# Patient Record
Sex: FEMALE | Race: WHITE | Employment: OTHER | URBAN - METROPOLITAN AREA
[De-identification: names, ages, dates, MRNs, and addresses within clinical notes are randomized per-mention and may not be internally consistent; named-entity substitution may affect disease eponyms.]

---

## 2021-11-12 ENCOUNTER — TRANSCRIBE ORDER (OUTPATIENT)
Dept: SCHEDULING | Age: 66
End: 2021-11-12

## 2021-11-12 DIAGNOSIS — D35.1 BENIGN NEOPLASM OF PARATHYROID GLAND: Primary | ICD-10-CM

## 2021-11-22 ENCOUNTER — HOSPITAL ENCOUNTER (OUTPATIENT)
Dept: ULTRASOUND IMAGING | Age: 66
Discharge: HOME | End: 2021-11-22
Attending: OTOLARYNGOLOGY
Payer: MEDICARE

## 2021-11-22 ENCOUNTER — HOSPITAL ENCOUNTER (OUTPATIENT)
Dept: CT IMAGING | Age: 66
Discharge: HOME | End: 2021-11-22
Attending: OTOLARYNGOLOGY
Payer: MEDICARE

## 2021-11-22 DIAGNOSIS — D35.1 BENIGN NEOPLASM OF PARATHYROID GLAND: ICD-10-CM

## 2021-11-22 PROCEDURE — 74011000636 HC RX REV CODE- 636: Performed by: OTOLARYNGOLOGY

## 2021-11-22 PROCEDURE — 70491 CT SOFT TISSUE NECK W/DYE: CPT

## 2021-11-22 PROCEDURE — 76536 US EXAM OF HEAD AND NECK: CPT

## 2021-11-22 RX ADMIN — IOPAMIDOL 100 ML: 612 INJECTION, SOLUTION INTRAVENOUS at 11:38

## 2021-12-08 ENCOUNTER — TRANSCRIBE ORDER (OUTPATIENT)
Dept: SCHEDULING | Age: 66
End: 2021-12-08

## 2021-12-08 DIAGNOSIS — D34 THYROID ADENOMA: Primary | ICD-10-CM

## 2021-12-08 DIAGNOSIS — E21.0 PRIMARY HYPERPARATHYROIDISM (HCC): ICD-10-CM

## 2021-12-09 ENCOUNTER — ANESTHESIA EVENT (OUTPATIENT)
Dept: MEDSURG UNIT | Age: 66
End: 2021-12-09
Payer: MEDICARE

## 2021-12-10 ENCOUNTER — HOSPITAL ENCOUNTER (OUTPATIENT)
Age: 66
Setting detail: OBSERVATION
Discharge: HOME | End: 2021-12-11
Attending: OTOLARYNGOLOGY | Admitting: OTOLARYNGOLOGY
Payer: MEDICARE

## 2021-12-10 ENCOUNTER — ANESTHESIA (OUTPATIENT)
Dept: MEDSURG UNIT | Age: 66
End: 2021-12-10
Payer: MEDICARE

## 2021-12-10 ENCOUNTER — APPOINTMENT (OUTPATIENT)
Dept: NUCLEAR MEDICINE | Age: 66
End: 2021-12-10
Attending: OTOLARYNGOLOGY
Payer: MEDICARE

## 2021-12-10 DIAGNOSIS — E21.0 PRIMARY HYPERPARATHYROIDISM (HCC): ICD-10-CM

## 2021-12-10 DIAGNOSIS — D34 THYROID ADENOMA: ICD-10-CM

## 2021-12-10 DIAGNOSIS — E21.3 HYPERPARATHYROIDISM (HCC): Primary | ICD-10-CM

## 2021-12-10 LAB
HGB BLD-MCNC: 14.3 G/DL (ref 11.5–16)
INTRA-OP PTH, IPTHRT: 144.3 PG/ML (ref 18.4–88)
INTRA-OP PTH, IPTHRT: 39.4 PG/ML (ref 18.4–88)
PTH-INTACT IO % DIF SERPL: NORMAL %
PTH-INTACT P EXCISION SERPL-MCNC: 28.1 PG/ML (ref 18.4–88)
SPECIMEN DRAWN SERPL: 1015
SPECIMEN DRAWN SERPL: 1120
SPECIMEN DRAWN SERPL: 1140

## 2021-12-10 PROCEDURE — 85018 HEMOGLOBIN: CPT

## 2021-12-10 PROCEDURE — 74011250637 HC RX REV CODE- 250/637: Performed by: ANESTHESIOLOGY

## 2021-12-10 PROCEDURE — 77030002933 HC SUT MCRYL J&J -A: Performed by: OTOLARYNGOLOGY

## 2021-12-10 PROCEDURE — 77030040922 HC BLNKT HYPOTHRM STRY -A

## 2021-12-10 PROCEDURE — 76060000062 HC AMB SURG ANES 1 TO 1.5 HR: Performed by: OTOLARYNGOLOGY

## 2021-12-10 PROCEDURE — 74011250636 HC RX REV CODE- 250/636: Performed by: ANESTHESIOLOGY

## 2021-12-10 PROCEDURE — 88331 PATH CONSLTJ SURG 1 BLK 1SPC: CPT

## 2021-12-10 PROCEDURE — 77030031139 HC SUT VCRL2 J&J -A: Performed by: OTOLARYNGOLOGY

## 2021-12-10 PROCEDURE — 83970 ASSAY OF PARATHORMONE: CPT

## 2021-12-10 PROCEDURE — 77030002916 HC SUT ETHLN J&J -A: Performed by: OTOLARYNGOLOGY

## 2021-12-10 PROCEDURE — 76030000019 HC AMB SURG 1 TO 1.5 HR INTENSV-TIER 1: Performed by: OTOLARYNGOLOGY

## 2021-12-10 PROCEDURE — 74011250636 HC RX REV CODE- 250/636: Performed by: OTOLARYNGOLOGY

## 2021-12-10 PROCEDURE — G0378 HOSPITAL OBSERVATION PER HR: HCPCS

## 2021-12-10 PROCEDURE — 78070 PARATHYROID PLANAR IMAGING: CPT

## 2021-12-10 PROCEDURE — 74011000250 HC RX REV CODE- 250: Performed by: SURGERY

## 2021-12-10 PROCEDURE — 93005 ELECTROCARDIOGRAM TRACING: CPT

## 2021-12-10 PROCEDURE — 2709999900 HC NON-CHARGEABLE SUPPLY: Performed by: OTOLARYNGOLOGY

## 2021-12-10 PROCEDURE — 74011000250 HC RX REV CODE- 250: Performed by: OTOLARYNGOLOGY

## 2021-12-10 PROCEDURE — 77030008698 HC TU ET REINF MEDT -D: Performed by: NURSE ANESTHETIST, CERTIFIED REGISTERED

## 2021-12-10 PROCEDURE — 74011250636 HC RX REV CODE- 250/636: Performed by: NURSE ANESTHETIST, CERTIFIED REGISTERED

## 2021-12-10 PROCEDURE — 77030014008 HC SPNG HEMSTAT J&J -C: Performed by: OTOLARYNGOLOGY

## 2021-12-10 PROCEDURE — 77030032988 HC TU ET NIM TRIVNTG EMG MEDT -D: Performed by: OTOLARYNGOLOGY

## 2021-12-10 PROCEDURE — 77030026438 HC STYL ET INTUB CARD -A: Performed by: NURSE ANESTHETIST, CERTIFIED REGISTERED

## 2021-12-10 PROCEDURE — 88305 TISSUE EXAM BY PATHOLOGIST: CPT

## 2021-12-10 PROCEDURE — 74011250637 HC RX REV CODE- 250/637: Performed by: OTOLARYNGOLOGY

## 2021-12-10 PROCEDURE — 36415 COLL VENOUS BLD VENIPUNCTURE: CPT

## 2021-12-10 PROCEDURE — 76210000038 HC AMBSU PH I REC 2.5 TO 3 HR: Performed by: OTOLARYNGOLOGY

## 2021-12-10 PROCEDURE — 74011000250 HC RX REV CODE- 250: Performed by: NURSE ANESTHETIST, CERTIFIED REGISTERED

## 2021-12-10 PROCEDURE — 77030031753 HC SHR ENDO COAG HARM J&J -E: Performed by: OTOLARYNGOLOGY

## 2021-12-10 PROCEDURE — 77030021052 HC RNG RETRCTR STAY COOP -A: Performed by: OTOLARYNGOLOGY

## 2021-12-10 RX ORDER — SODIUM CHLORIDE 0.9 % (FLUSH) 0.9 %
5-40 SYRINGE (ML) INJECTION AS NEEDED
Status: DISCONTINUED | OUTPATIENT
Start: 2021-12-10 | End: 2021-12-10 | Stop reason: HOSPADM

## 2021-12-10 RX ORDER — HYDROMORPHONE HYDROCHLORIDE 1 MG/ML
0.2 INJECTION, SOLUTION INTRAMUSCULAR; INTRAVENOUS; SUBCUTANEOUS
Status: DISCONTINUED | OUTPATIENT
Start: 2021-12-10 | End: 2021-12-10 | Stop reason: HOSPADM

## 2021-12-10 RX ORDER — DEXTROSE, SODIUM CHLORIDE, AND POTASSIUM CHLORIDE 5; .45; .15 G/100ML; G/100ML; G/100ML
25 INJECTION INTRAVENOUS CONTINUOUS
Status: DISCONTINUED | OUTPATIENT
Start: 2021-12-10 | End: 2021-12-11 | Stop reason: HOSPADM

## 2021-12-10 RX ORDER — EPHEDRINE SULFATE/0.9% NACL/PF 50 MG/5 ML
5 SYRINGE (ML) INTRAVENOUS AS NEEDED
Status: DISCONTINUED | OUTPATIENT
Start: 2021-12-10 | End: 2021-12-10 | Stop reason: HOSPADM

## 2021-12-10 RX ORDER — LIDOCAINE HYDROCHLORIDE 10 MG/ML
0.1 INJECTION, SOLUTION EPIDURAL; INFILTRATION; INTRACAUDAL; PERINEURAL AS NEEDED
Status: DISCONTINUED | OUTPATIENT
Start: 2021-12-10 | End: 2021-12-10 | Stop reason: HOSPADM

## 2021-12-10 RX ORDER — DIPHENHYDRAMINE HYDROCHLORIDE 50 MG/ML
12.5 INJECTION, SOLUTION INTRAMUSCULAR; INTRAVENOUS AS NEEDED
Status: DISCONTINUED | OUTPATIENT
Start: 2021-12-10 | End: 2021-12-10 | Stop reason: HOSPADM

## 2021-12-10 RX ORDER — MIDAZOLAM HYDROCHLORIDE 1 MG/ML
1 INJECTION, SOLUTION INTRAMUSCULAR; INTRAVENOUS AS NEEDED
Status: DISCONTINUED | OUTPATIENT
Start: 2021-12-10 | End: 2021-12-10 | Stop reason: HOSPADM

## 2021-12-10 RX ORDER — MIDAZOLAM HYDROCHLORIDE 1 MG/ML
0.5 INJECTION, SOLUTION INTRAMUSCULAR; INTRAVENOUS
Status: DISCONTINUED | OUTPATIENT
Start: 2021-12-10 | End: 2021-12-10 | Stop reason: HOSPADM

## 2021-12-10 RX ORDER — SODIUM CHLORIDE 0.9 % (FLUSH) 0.9 %
5-40 SYRINGE (ML) INJECTION AS NEEDED
Status: DISCONTINUED | OUTPATIENT
Start: 2021-12-10 | End: 2021-12-11 | Stop reason: HOSPADM

## 2021-12-10 RX ORDER — FENTANYL CITRATE 50 UG/ML
25 INJECTION, SOLUTION INTRAMUSCULAR; INTRAVENOUS
Status: DISCONTINUED | OUTPATIENT
Start: 2021-12-10 | End: 2021-12-10 | Stop reason: HOSPADM

## 2021-12-10 RX ORDER — HYDRALAZINE HYDROCHLORIDE 20 MG/ML
10 INJECTION INTRAMUSCULAR; INTRAVENOUS
Status: DISCONTINUED | OUTPATIENT
Start: 2021-12-10 | End: 2021-12-11 | Stop reason: HOSPADM

## 2021-12-10 RX ORDER — SUCCINYLCHOLINE CHLORIDE 20 MG/ML
INJECTION INTRAMUSCULAR; INTRAVENOUS AS NEEDED
Status: DISCONTINUED | OUTPATIENT
Start: 2021-12-10 | End: 2021-12-10 | Stop reason: HOSPADM

## 2021-12-10 RX ORDER — MORPHINE SULFATE 2 MG/ML
2 INJECTION, SOLUTION INTRAMUSCULAR; INTRAVENOUS
Status: DISCONTINUED | OUTPATIENT
Start: 2021-12-10 | End: 2021-12-10 | Stop reason: HOSPADM

## 2021-12-10 RX ORDER — ACETAMINOPHEN 325 MG/1
650 TABLET ORAL ONCE
Status: COMPLETED | OUTPATIENT
Start: 2021-12-10 | End: 2021-12-10

## 2021-12-10 RX ORDER — SODIUM CHLORIDE 0.9 % (FLUSH) 0.9 %
5-40 SYRINGE (ML) INJECTION EVERY 8 HOURS
Status: DISCONTINUED | OUTPATIENT
Start: 2021-12-10 | End: 2021-12-10 | Stop reason: HOSPADM

## 2021-12-10 RX ORDER — FENTANYL CITRATE 50 UG/ML
50 INJECTION, SOLUTION INTRAMUSCULAR; INTRAVENOUS AS NEEDED
Status: DISCONTINUED | OUTPATIENT
Start: 2021-12-10 | End: 2021-12-10 | Stop reason: HOSPADM

## 2021-12-10 RX ORDER — SODIUM CHLORIDE 9 MG/ML
1000 INJECTION, SOLUTION INTRAVENOUS CONTINUOUS
Status: DISCONTINUED | OUTPATIENT
Start: 2021-12-10 | End: 2021-12-10 | Stop reason: HOSPADM

## 2021-12-10 RX ORDER — FERROUS SULFATE, DRIED 160(50) MG
1 TABLET, EXTENDED RELEASE ORAL EVERY 6 HOURS
Status: DISCONTINUED | OUTPATIENT
Start: 2021-12-10 | End: 2021-12-11 | Stop reason: HOSPADM

## 2021-12-10 RX ORDER — FENTANYL CITRATE 50 UG/ML
INJECTION, SOLUTION INTRAMUSCULAR; INTRAVENOUS AS NEEDED
Status: DISCONTINUED | OUTPATIENT
Start: 2021-12-10 | End: 2021-12-10 | Stop reason: HOSPADM

## 2021-12-10 RX ORDER — ONDANSETRON 2 MG/ML
INJECTION INTRAMUSCULAR; INTRAVENOUS AS NEEDED
Status: DISCONTINUED | OUTPATIENT
Start: 2021-12-10 | End: 2021-12-10 | Stop reason: HOSPADM

## 2021-12-10 RX ORDER — LIDOCAINE HYDROCHLORIDE 20 MG/ML
INJECTION, SOLUTION EPIDURAL; INFILTRATION; INTRACAUDAL; PERINEURAL AS NEEDED
Status: DISCONTINUED | OUTPATIENT
Start: 2021-12-10 | End: 2021-12-10 | Stop reason: HOSPADM

## 2021-12-10 RX ORDER — ONDANSETRON 2 MG/ML
4 INJECTION INTRAMUSCULAR; INTRAVENOUS
Status: DISCONTINUED | OUTPATIENT
Start: 2021-12-10 | End: 2021-12-11 | Stop reason: HOSPADM

## 2021-12-10 RX ORDER — HYDRALAZINE HYDROCHLORIDE 20 MG/ML
10 INJECTION INTRAMUSCULAR; INTRAVENOUS ONCE
Status: COMPLETED | OUTPATIENT
Start: 2021-12-10 | End: 2021-12-10

## 2021-12-10 RX ORDER — PROPOFOL 10 MG/ML
INJECTION, EMULSION INTRAVENOUS AS NEEDED
Status: DISCONTINUED | OUTPATIENT
Start: 2021-12-10 | End: 2021-12-10 | Stop reason: HOSPADM

## 2021-12-10 RX ORDER — SODIUM CHLORIDE, SODIUM LACTATE, POTASSIUM CHLORIDE, CALCIUM CHLORIDE 600; 310; 30; 20 MG/100ML; MG/100ML; MG/100ML; MG/100ML
125 INJECTION, SOLUTION INTRAVENOUS CONTINUOUS
Status: DISCONTINUED | OUTPATIENT
Start: 2021-12-10 | End: 2021-12-10 | Stop reason: HOSPADM

## 2021-12-10 RX ORDER — OXYCODONE AND ACETAMINOPHEN 5; 325 MG/1; MG/1
1 TABLET ORAL
Status: DISCONTINUED | OUTPATIENT
Start: 2021-12-10 | End: 2021-12-11 | Stop reason: HOSPADM

## 2021-12-10 RX ORDER — OXYCODONE AND ACETAMINOPHEN 5; 325 MG/1; MG/1
1 TABLET ORAL AS NEEDED
Status: DISCONTINUED | OUTPATIENT
Start: 2021-12-10 | End: 2021-12-10 | Stop reason: HOSPADM

## 2021-12-10 RX ORDER — SODIUM CHLORIDE 0.9 % (FLUSH) 0.9 %
5-40 SYRINGE (ML) INJECTION EVERY 8 HOURS
Status: DISCONTINUED | OUTPATIENT
Start: 2021-12-10 | End: 2021-12-11 | Stop reason: HOSPADM

## 2021-12-10 RX ORDER — ONDANSETRON 2 MG/ML
4 INJECTION INTRAMUSCULAR; INTRAVENOUS AS NEEDED
Status: DISCONTINUED | OUTPATIENT
Start: 2021-12-10 | End: 2021-12-10 | Stop reason: HOSPADM

## 2021-12-10 RX ORDER — TETRAKIS(2-METHOXYISOBUTYLISOCYANIDE)COPPER(I) TETRAFLUOROBORATE 1 MG/ML
21.7 INJECTION, POWDER, LYOPHILIZED, FOR SOLUTION INTRAVENOUS
Status: COMPLETED | OUTPATIENT
Start: 2021-12-10 | End: 2021-12-10

## 2021-12-10 RX ORDER — PHENYLEPHRINE HCL IN 0.9% NACL 0.4MG/10ML
SYRINGE (ML) INTRAVENOUS AS NEEDED
Status: DISCONTINUED | OUTPATIENT
Start: 2021-12-10 | End: 2021-12-10 | Stop reason: HOSPADM

## 2021-12-10 RX ORDER — LIDOCAINE HYDROCHLORIDE AND EPINEPHRINE 10; 10 MG/ML; UG/ML
INJECTION, SOLUTION INFILTRATION; PERINEURAL AS NEEDED
Status: DISCONTINUED | OUTPATIENT
Start: 2021-12-10 | End: 2021-12-10 | Stop reason: HOSPADM

## 2021-12-10 RX ORDER — HYDRALAZINE HYDROCHLORIDE 20 MG/ML
20 INJECTION INTRAMUSCULAR; INTRAVENOUS ONCE
Status: DISCONTINUED | OUTPATIENT
Start: 2021-12-10 | End: 2021-12-10

## 2021-12-10 RX ORDER — OXYCODONE AND ACETAMINOPHEN 10; 325 MG/1; MG/1
1 TABLET ORAL
Status: DISCONTINUED | OUTPATIENT
Start: 2021-12-10 | End: 2021-12-11 | Stop reason: HOSPADM

## 2021-12-10 RX ORDER — MIDAZOLAM HYDROCHLORIDE 1 MG/ML
INJECTION, SOLUTION INTRAMUSCULAR; INTRAVENOUS AS NEEDED
Status: DISCONTINUED | OUTPATIENT
Start: 2021-12-10 | End: 2021-12-10 | Stop reason: HOSPADM

## 2021-12-10 RX ORDER — HYDRALAZINE HYDROCHLORIDE 20 MG/ML
5 INJECTION INTRAMUSCULAR; INTRAVENOUS ONCE
Status: COMPLETED | OUTPATIENT
Start: 2021-12-10 | End: 2021-12-10

## 2021-12-10 RX ORDER — KETAMINE HYDROCHLORIDE 10 MG/ML
INJECTION, SOLUTION INTRAMUSCULAR; INTRAVENOUS AS NEEDED
Status: DISCONTINUED | OUTPATIENT
Start: 2021-12-10 | End: 2021-12-10 | Stop reason: HOSPADM

## 2021-12-10 RX ORDER — DEXAMETHASONE SODIUM PHOSPHATE 4 MG/ML
INJECTION, SOLUTION INTRA-ARTICULAR; INTRALESIONAL; INTRAMUSCULAR; INTRAVENOUS; SOFT TISSUE AS NEEDED
Status: DISCONTINUED | OUTPATIENT
Start: 2021-12-10 | End: 2021-12-10 | Stop reason: HOSPADM

## 2021-12-10 RX ORDER — CHOLECALCIFEROL (VITAMIN D3) 125 MCG
2000 CAPSULE ORAL DAILY
COMMUNITY

## 2021-12-10 RX ORDER — DEXMEDETOMIDINE HYDROCHLORIDE 100 UG/ML
INJECTION, SOLUTION INTRAVENOUS AS NEEDED
Status: DISCONTINUED | OUTPATIENT
Start: 2021-12-10 | End: 2021-12-10 | Stop reason: HOSPADM

## 2021-12-10 RX ORDER — HYDROCODONE BITARTRATE AND ACETAMINOPHEN 7.5; 325 MG/1; MG/1
1 TABLET ORAL DAILY
COMMUNITY
End: 2021-12-11

## 2021-12-10 RX ORDER — ACETAMINOPHEN 325 MG/1
650 TABLET ORAL
Status: DISCONTINUED | OUTPATIENT
Start: 2021-12-10 | End: 2021-12-11 | Stop reason: HOSPADM

## 2021-12-10 RX ORDER — FENTANYL CITRATE 50 UG/ML
50 INJECTION, SOLUTION INTRAMUSCULAR; INTRAVENOUS
Status: DISCONTINUED | OUTPATIENT
Start: 2021-12-10 | End: 2021-12-10 | Stop reason: HOSPADM

## 2021-12-10 RX ORDER — SODIUM CHLORIDE 9 MG/ML
50 INJECTION, SOLUTION INTRAVENOUS CONTINUOUS
Status: DISCONTINUED | OUTPATIENT
Start: 2021-12-10 | End: 2021-12-10 | Stop reason: HOSPADM

## 2021-12-10 RX ADMIN — MIDAZOLAM 2 MG: 1 INJECTION INTRAMUSCULAR; INTRAVENOUS at 10:40

## 2021-12-10 RX ADMIN — Medication 80 MCG: at 11:05

## 2021-12-10 RX ADMIN — ONDANSETRON HYDROCHLORIDE 4 MG: 2 INJECTION, SOLUTION INTRAMUSCULAR; INTRAVENOUS at 10:57

## 2021-12-10 RX ADMIN — LIDOCAINE HYDROCHLORIDE 60 MG: 20 INJECTION, SOLUTION EPIDURAL; INFILTRATION; INTRACAUDAL; PERINEURAL at 10:45

## 2021-12-10 RX ADMIN — KETAMINE HYDROCHLORIDE 20 MG: 10 INJECTION, SOLUTION INTRAMUSCULAR; INTRAVENOUS at 10:50

## 2021-12-10 RX ADMIN — DEXMEDETOMIDINE HYDROCHLORIDE 4 MCG: 100 INJECTION, SOLUTION, CONCENTRATE INTRAVENOUS at 11:59

## 2021-12-10 RX ADMIN — SUCCINYLCHOLINE CHLORIDE 140 MG: 20 INJECTION, SOLUTION INTRAMUSCULAR; INTRAVENOUS at 10:45

## 2021-12-10 RX ADMIN — FENTANYL CITRATE 25 MCG: 0.05 INJECTION, SOLUTION INTRAMUSCULAR; INTRAVENOUS at 13:41

## 2021-12-10 RX ADMIN — Medication 80 MCG: at 11:00

## 2021-12-10 RX ADMIN — Medication 80 MCG: at 10:55

## 2021-12-10 RX ADMIN — HYDRALAZINE HYDROCHLORIDE 10 MG: 20 INJECTION INTRAMUSCULAR; INTRAVENOUS at 12:27

## 2021-12-10 RX ADMIN — HYDRALAZINE HYDROCHLORIDE 5 MG: 20 INJECTION INTRAMUSCULAR; INTRAVENOUS at 13:54

## 2021-12-10 RX ADMIN — POTASSIUM CHLORIDE, DEXTROSE MONOHYDRATE AND SODIUM CHLORIDE 25 ML/HR: 150; 5; 450 INJECTION, SOLUTION INTRAVENOUS at 18:13

## 2021-12-10 RX ADMIN — OXYCODONE AND ACETAMINOPHEN 1 TABLET: 5; 325 TABLET ORAL at 18:00

## 2021-12-10 RX ADMIN — PROPOFOL 150 MG: 10 INJECTION, EMULSION INTRAVENOUS at 10:45

## 2021-12-10 RX ADMIN — DEXAMETHASONE SODIUM PHOSPHATE 4 MG: 4 INJECTION, SOLUTION INTRAMUSCULAR; INTRAVENOUS at 10:57

## 2021-12-10 RX ADMIN — DEXMEDETOMIDINE HYDROCHLORIDE 4 MCG: 100 INJECTION, SOLUTION, CONCENTRATE INTRAVENOUS at 12:03

## 2021-12-10 RX ADMIN — FENTANYL CITRATE 50 MCG: 50 INJECTION, SOLUTION INTRAMUSCULAR; INTRAVENOUS at 10:50

## 2021-12-10 RX ADMIN — SODIUM CHLORIDE 40 MCG/MIN: 9 INJECTION, SOLUTION INTRAVENOUS at 11:09

## 2021-12-10 RX ADMIN — Medication 80 MCG: at 10:53

## 2021-12-10 RX ADMIN — ACETAMINOPHEN 650 MG: 325 TABLET ORAL at 10:28

## 2021-12-10 RX ADMIN — FENTANYL CITRATE 50 MCG: 50 INJECTION, SOLUTION INTRAMUSCULAR; INTRAVENOUS at 10:45

## 2021-12-10 RX ADMIN — CALCIUM CARBONATE-VITAMIN D TAB 500 MG-200 UNIT 1 TABLET: 500-200 TAB at 18:00

## 2021-12-10 RX ADMIN — SODIUM CHLORIDE, POTASSIUM CHLORIDE, SODIUM LACTATE AND CALCIUM CHLORIDE 125 ML/HR: 600; 310; 30; 20 INJECTION, SOLUTION INTRAVENOUS at 10:29

## 2021-12-10 RX ADMIN — WATER 1500 MG: 1 INJECTION INTRAMUSCULAR; INTRAVENOUS; SUBCUTANEOUS at 10:56

## 2021-12-10 RX ADMIN — PROPOFOL 50 MG: 10 INJECTION, EMULSION INTRAVENOUS at 11:02

## 2021-12-10 RX ADMIN — TETRAKIS(2-METHOXYISOBUTYLISOCYANIDE)COPPER(I) TETRAFLUOROBORATE 21.7 MILLICURIE: 1 INJECTION, POWDER, LYOPHILIZED, FOR SOLUTION INTRAVENOUS at 09:10

## 2021-12-10 RX ADMIN — Medication 80 MCG: at 10:57

## 2021-12-10 RX ADMIN — FENTANYL CITRATE 50 MCG: 50 INJECTION, SOLUTION INTRAMUSCULAR; INTRAVENOUS at 11:02

## 2021-12-10 NOTE — CONSULTS
[address] [facility]  Hospitalist Group    Hospitalist Consult  Primary Care Provider: Zhanna Luciano MD  Consult requested by: Janine Chawla MD     Subjective:     Lea Edge is a 74 y.o. female with a past medical history of primary hyperparathyroidism who is s/p neck exploration, parathyroidectomy today. Hospitalist services was consulted for blood pressure management. Upon examination patient is awake, alert and oriented. She states that she has not had any history of high blood pressures. She states that she feels it is \"white coat\" and pain related. Has a history of chronic back pain and normally uses heating pads throughout the day. States pain is currently 7/10. Anterior neck surgical incision dressing noted- Clean dry and intact. States neck pain is 6/10. Recently given pain medication by RN. In PACU she was given hydralazine 10mg IV for /80. She denies any headaches or dizziness. Denies any cough, chest pain, shortness of breath. Denies fever or chills. Denies nausea, vomiting, diarrhea, constipation. Review of Systems:    A comprehensive review of systems was negative except for that written in the History of Present Illness. Past Medical History:   Diagnosis Date    Chronic pain     lumbar back pain, right    Pilonidal cyst       Past Surgical History:   Procedure Laterality Date    HX GI      choly    HX GYN      BTL    HX HEENT      wisdom teeth    HX ORTHOPAEDIC      janet heel surgery    HX OTHER SURGICAL      pilonidal cyst excision     Prior to Admission medications    Medication Sig Start Date End Date Taking? Authorizing Provider   HYDROcodone-acetaminophen (NORCO) 7.5-325 mg per tablet Take 1 Tablet by mouth daily. Patient states takes 1/2 pill daily   Yes Provider, Historical   cholecalciferol, vitamin D3, (Vitamin D3) 50 mcg (2,000 unit) tab Take 2,000 Int'l Units by mouth daily.    Yes Provider, Historical     Allergies   Allergen Reactions    Bactrim [Sulfamethoprim] Nausea Only    Erythromycin Other (comments)     abd pain      History reviewed. No pertinent family history. SOCIAL HISTORY:  Patient resides at Home. Patient ambulates with Independent. Smoking history: 1/2 pack per day x >40 years   Alcohol history: Denies   Drug history: Denies     Objective:       Physical Exam:   Visit Vitals  BP (!) 166/69 (BP 1 Location: Right upper arm, BP Patient Position: Semi fowlers)   Pulse 87   Temp 98.9 °F (37.2 °C)   Resp 18   Ht 5' 1\" (1.549 m)   Wt 42.6 kg (94 lb)   SpO2 96%   BMI 17.76 kg/m²     General appearance: alert, cooperative, no distress, appears stated age  Lungs: clear to auscultation bilaterally  Heart: regular rate and rhythm, S1, S2 normal, no murmur, click, rub or gallop  Abdomen: soft, non-tender. Bowel sounds normal. No masses,  no organomegaly  Extremities: extremities normal, atraumatic, no cyanosis or edema  Skin: Skin color, texture, turgor normal. No rashes or lesions  Neurologic: Grossly normal    ECG: Pending     Data Review: All diagnostic labs and studies have been reviewed. Assessment:       Active Problems:    Hyperparathyroidism ([address] (12/10/2021)        Plan:     Elevated blood pressure   - No history of HTN   - Increased back and neck pain. Please control pain with medication and warm compresses. - Hydralazine PRN for SBP >170, if pain is controlled  - Monitor vital signs per unit routine    Hyperparathyroidism   - s/p neck exploration, parathyroidectomy   - Management per primary team     Thank you for allowing us to participate in patient's care. We will follow along.    Signed By: Tresa Dai NP     Date of Service:  12/10/2021

## 2021-12-10 NOTE — PROGRESS NOTES
TRANSFER - OUT REPORT:    Verbal report given to [place] LOCO RN(name) on Rena Spence  being transferred to [facility](unit) for routine post - op       Report consisted of patients Situation, Background, Assessment and   Recommendations(SBAR). Information from the following report(s) SBAR, OR Summary, Intake/Output and MAR was reviewed with the receiving nurse. Lines:   Peripheral IV 12/10/21 Anterior; Left Forearm (Active)   Site Assessment Clean, dry, & intact 12/10/21 1235   Phlebitis Assessment 0 12/10/21 1235   Infiltration Assessment 0 12/10/21 1235   Dressing Status Clean, dry, & intact 12/10/21 1235   Dressing Type Transparent 12/10/21 1235   Hub Color/Line Status Infusing 12/10/21 1235        Opportunity for questions and clarification was provided.       Patient transported with:   Registered Nurse

## 2021-12-10 NOTE — OP NOTES
Date of Procedure: 10 December 2021  Pre-operative Diagnosis: Symptomatic Primary Hyperparathyroidism  Post-operative Diagnosis: Symptomatic Primary Hyperparathyroidism  Procedure(s):   Neck exploration   Radioguided parathyroid surgery  Parathyroidectomy  resection of LEFT SUPERIOR parathyroid adenoma  Parathyroidectomy  resection of LEFT INFERIOR parathyroid adenoma  Laryngeal nerve monitoring  Surgeon(s) and Role:   Panel 1:   * Kia Jackson MD  Co-surgeon   * Rosaura Sewell MD - Co-surgeon   Anesthesia: General + 5 ml of Local (50:50 mix of 1% LIDO + EPI)   Urine output: Not documented   Estimated Blood Loss: 2 ml    IVF: 600 ml   Drains: None   Pre-operative iPTH = 144.3 pg/ml   Patient in room at 1041 hours   Antibiotic prophylaxis Ancef 1.5 gm given at 1055 hours   Beta blocker not indicated   Pre-prep time out: 1053 hours   Prayer at 1059 hours   Time out for Surgery at 1059 hours   (Correct patient, operative site and procedure confirmed, along with having the necessary equipment on hand to perform the operation safely)   Start of surgery at 1100 hours   End of surgery at 1135 hours   VTE prophylaxis with bilateral bilateral lower extremity compression devices. Pressure points padded. Sponge, sharp and instrument count: Correct       History:  70-year-old female with symptomatic primary hyperparathyroidism  fatigue, tired and achy.      She has biochemically confirmed primary hyperparathyroidism (hypercalcemia with non-suppressed serum PTH):     Serum Calcium: 10.6 mg/dl  Serum iPTH: 80.0 pg/ml    No family history of endocrine neoplasm. 11/22/2021  EXAM:  CT NECK SOFT TISSUE W CONT  INDICATION:  Concern for parathyroid adenoma. COMPARISON: Ultrasound neck 11/22/2021. CONTRAST: 100 mL of Isovue-300. TECHNIQUE: Multislice helical CT was performed of the neck prior to and following uneventful rapid bolus intravenous contrast administration.  Contiguous axial images were reconstructed, and lung and soft tissue windows were generated. Coronal and sagittal reformations were generated. CT dose reduction was achieved through use of a standardized protocol tailored for this examination and automatic exposure control for dose modulation. FINDINGS: Multinodular thyroid gland, with multiple small exophytic nodules extending posteriorly from the left thyroid gland. There is no discrete hyperenhancing lesion in the tracheoesophageal grooves in the region of the thyroid gland to suggest parathyroid adenoma. The aerodigestive tract is unremarkable. The parotid and submandibular glands are unremarkable. No evidence of pathologically enlarged cervical lymph nodes. Visualized brain parenchyma is normal in appearance. Visualized paranasal sinuses and mastoid air cells are clear. Visualized intraorbital contents are unremarkable. The cervical vasculature is patent. No acute fracture or aggressive osseous lesion. 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 Multilevel degenerative disc disease in the cervical spine, most advanced at C6-C7. Visualized portions of the lung apices are normal. Moderate calcific atherosclerosis of the aortic arch. IMPRESSION: No definite parathyroid adenoma identified. Multinodular thyroid gland, with multiple exophytic nodules extending posteriorly from the left thyroid gland. 11/22/2021  EXAM:  US HEAD NECK SOFT TISSUE   INDICATION: Concern for parathyroid adenoma. COMPARISON: None. TECHNIQUE: Real-time sonography of the neck was performed with a high frequency  linear transducer. Multiple static images were obtained. FINDINGS: At the posterior aspect of the left inferior thyroid gland, there is a hypoechoic lesion measuring 1.1 x 0.8 x 1.2 cm, which demonstrates internal Doppler flow. Multiple additional small thyroid nodules are noted throughout the thyroid gland.   IMPRESSION: A 1.1 x 0.8 x 1.2 cm hypoechoic lesion at the posterior aspect of the left inferior thyroid gland, which appears to represent an exophytic thyroid nodule over a parathyroid adenoma. Please see dedicated CT neck performed subsequently for further evaluation     Pre-op holding area: The patient was seen in the pre-operative holding area. Patient was informed of the indications, nature, risks, benefits, alternatives and expected outcomes of the proposed operation:   Neck exploration   Radioguided parathyroid surgery / parathyroidectomy   All other indicated procedures   The patient previously voiced understanding of the aforesaid and provided informed consent for the planned operation. The patient asked pertinent questions, all of which were answered to her apparent satisfaction. The informed consent was reviewed in the holding area and the patient voiced understanding and agreement. The patient agreed to proceed with the recommended operative procedure (Neck exploration; radioguided parathyroid surgery / parathyroidectomy; all other indicated procedures). The patient was examined, and the operative site was marked. Pre-operative imaging is localizing for left inferior parathyroid adenoma. Procedure (s) performed:   Neck exploration   Radioguided parathyroid surgery  Parathyroidectomy  resection of LEFT SUPERIOR parathyroid adenoma  Parathyroidectomy  resection of LEFT INFERIOR parathyroid adenoma  Laryngeal nerve monitoring   Findings:   Abnormal parathyroid gland identified  grossly consistent with adenoma in the LEFT SUPERIOR anatomical location   Left superior parathyroid adenoma (17 x 12 mm; 0.65 grams); completely excised  intact and submitted to pathology.    Frozen section: confirmatory  hypercellular parathyroid tissue   Abnormal LEFT INFERIOR parathyroid gland identified beneath the left thyroid capsule within the left thyroid lobe  grossly consistent with adenoma   Left inferior parathyroid, intrathyroidal adenoma (8 x 5 mm; 0.07 grams); completely excised  intact and submitted to pathology. Frozen section: confirmatory  hypercellular parathyroid tissue     Right superior parathyroid gland, clinically normal (5 x 3 mm); No biopsy obtained. Right inferior parathyroid gland, clinically normal (5 x 2 mm); No biopsy obtained. The right thyroid lobe measures 4 x 3 cm  The left thyroid lobe measures 3.5 x 2 cm  The isthmus measures 3 mm. Multiple, small, sub-centimeter, and encapsulated thyroid nodules throughout the thyroid gland; no findings suspicious for malignancy. No palpable central or lateral neck adenopathy. A diligent search of the central and both lateral areas of the neck was unrevealing, specifically there was no palpable adenopathy in either the right or left lateral (Level II, III, IV) neck, or the central compartment (Level VI and VII). Both right and left recurrent laryngeal nerves were identified and carefully preserved throughout the entire course of the operation, and both recurrent laryngeal nerves were confirmed to be structurally and functionally intact. The structural and functional integrity of the left and right recurrent laryngeal nerves was confirmed with the nerve stimulator ([employer] System), as prominent audible signal was attained when both branches of the left and right recurrent laryngeal nerves were stimulated. Excellent hemostasis confirmed at end of operation.        Specimens:  Para-   thyroid  Right   Left      Type  Score  Type  Score    Upper    Upper      Parathyroid Clinically normal   (5 x 3 mm in size)  Biopsy not obtained  Frozen section: None   Parathyroid Adenoma   (17 x 12 mm in size;   weight 0.06 grams)   Completely excised   Frozen: Hypercellular parathyroid Counts = 989  Background: 38     Lower         Parathyroid Clinically normal   (5 x 2 mm in size)  Biopsy not obtained  Frozen section: None  Parathyroid Adenoma   (8 x 5 mm in size;   weight 0.07 grams)   Completely excised   Frozen: Hypercellular parathyroid Counts = 132  Background: 38       Operation:   The patient was escorted to the operating room. Upon arrival to the operative room, the patient, procedure, and operative site were confirmed via a pre-operative TIME OUT; all staff in attendance agreed. The patient was placed in the supine position and general anesthesia induced without incident, using a NIM endotracheal tube for laryngeal nerve monitoring. Prophylactic antibiotic (Ancef 1.5 gm IVPB) was administered prior to the procedure. Beta blocker not indicated. With the patient in the supine position the arms were tucked, a pillow was placed behind the knees and the heels padded; the neck was slightly extended, and head of the operating table elevated to 30 degrees. Roll was placed behind the scapulae to create mild degree of neck extension. Pressure ulcer prophylaxis was in effect with pressure point padding. VTE prophylaxis was also in effect with lower extremity mechanical compression devices. Sterile anterior neck prep (Chlorhexidine - alcohol) and drape. We prayed for our patient, and we repeated the TIME OUT prior to commencing the operation to confirm the    correct patient,    correct operative site,    correct operative procedure, and    necessary equipment on hand to conduct the operation safely. Local anesthesia (5 ml of 50:50 mix of 1% LIDO + EPI) infiltrated subcutaneously at site of planned anterior cervical skin incision. Pre-operative serum iPTH level was 144.3 pg/ml. A 4 cm, transverse anterior cervical incision was made. Limited sub-platysmal flaps were created. Jooxpro[employer] Navigator GPS radio-immuno-guided surgery device was set on Tc99m and 100X. Strap musculature was  in the midline. A dissection plane was developed posterior to the left strap musculature using cautery and facilitated by atraumatic [employer] retractors.    Springer Necessary was used to displace the left thyroid lobe anteriorly and medially as the left lateral thyroid recess was developed using electrocautery, and then gentle blunt dissection using the Kitner dissector. The fibroareolar floor in the left posterior neck was opened with electrocautery that provided access to the left retroesophageal space. We sought to identify the key structures for exposure of the left superior parathyroid gland the arteriovenous band situated immediately cephalad to where the left middle thyroid vein is situated, and posterior and lateral to the recurrent laryngeal nerve. The superior parathyroid gland is usually encountered at the Ligament of [place] dorsolateral to the recurrent laryngeal nerve. The superior parathyroid gland originates from the 4th pharyngeal pouch. The superior parathyroid gland is more posterior in position relative to the more anteriorly situated inferior parathyroid gland. The inferior parathyroid gland is usually encountered anterior and medial to the recurrent laryngeal nerve, inferior to where the recurrent laryngeal nerve crosses the inferior thyroid artery. The inferior parathyroid gland originates from the 3rd pharyngeal pouch. Typically (~75% of cases), the superior parathyroid gland is situated near the posterior aspect of the thyroid gland, at the level of the cricothyroid junction, in proximity to where the recurrent laryngeal nerve enters the larynx. The superior parathyroid gland is consistently situated dorsal/posterior and lateral to the recurrent laryngeal nerve. In a smaller percentage of patients (~24% of cases) the superior parathyroid gland is adjacent to the superior pole of the thyroid gland. In a small percentage of patients (~1%), the superior parathyroid gland is in a retro-pharyngeal, para-esophageal, retro-esophageal, or posterior-superior mediastinal location. Rarely is the superior parathyroid gland situated within the substance of the superior or lateral pole of the thyroid gland.   Dissection was performed using a combination of gentle blunt Kitner dissection as well as the tip of a right-angle clamp to identify and expose an abnormal appearing left superior parathyroid gland (17 x 12 mm in size)   grossly consistent with left inferior parathyroid gland adenoma. We took care not to disrupt the left superior parathyroid gland during dissection, mobilization, and complete evaluation of this parathyroid gland. The left superior parathyroid gland adenoma was excised (17 x 12 mm in size and 0.65 grams in weight with ex vivo counts of 989 pg/mg/min and background count of 38 pg/mg/min; i.e. hyperactive parathyroid), and submitted to frozen section, which was confirmatory  hypercellular parathyroid tissue. Blood was drawn after the excision of the left superior parathyroid adenoma. ---   We began the search for the left inferior parathyroid gland by dissecting gently low into the thyro-thymic ligament and apical thymus, and then proceeding cephalad with the dissection. The anatomic location of the inferior parathyroid gland is much more variable than that of the superior parathyroid gland, given the longer and more variable embryological migratory path of the 3rd pharyngeal pouch with the thymus. The inferior parathyroid gland may be embedded within the thymus, within the thyroid gland itself, or found within the anterior or posterior mediastinum. The most common location we find the inferior parathyroid gland (~40% of the time) is in proximity to the posterior thyroid capsule at the level of the 1st tracheal ring, in proximity to the inferior thyroid artery - anterior and medial to the recurrent laryngeal nerve, inferior to where the nerve crosses the inferior thyroid artery.   When we dont find the inferior parathyroid gland in this location we search for other possible anatomic locations  thyro-thymic ligament, within the substance of the thymus near the level of the thoracic inlet (in ~40% of cases the inferior parathyroid gland is found in this location). Other potential anatomic locations for the inferior parathyroid gland include lateral to the thyroid gland (15%), or in atypical or ectopic locations (carotid sheath or mid-thyroid level lateral to the carotid sheath, or deep within the thyroid gland itself 2% of cases). When not identified during initial operation, post-operative imaging is performed in cases of persistent or recurrent hyperparathyroidism to search for the abnormal inferior parathyroid gland, or in rare cases, the supernumerary or 5th parathyroid gland. Anatomic areas assessed during post-operative imaging in patients with persistent or recurrent hyperparathyroidism include the anterior or posterior mediastinum, posterior to the trachea and anterior to the esophagus, or posterior to the esophagus, and at or above the angle of the mandible (to assess for an undescended parathyroid gland). We were unable to locate the left inferior parathyroid gland. ---   Attention was then directed to the right neck. A dissection plane was developed posterior to the right neck strap musculature using cautery and facilitated by atraumatic [facility] retractors   A Berniece Huh was used to displace the right thyroid lobe anteromedially as the right lateral thyroid recess was developed using electrocautery, and then gentle blunt dissection using a Kitner dissector. The fibroareolar floor was opened with electrocautery that provided access to the right posterior neck  the para-esophageal space. We sought to identify the key structures for exposure of the right superior parathyroid gland the arteriovenous band situated immediately cephalad to where the right middle thyroid vein is situated. The right superior parathyroid gland was situated just posterior and lateral to the encountered right recurrent laryngeal nerve.    The right superior parathyroid gland was identified and found to be clinically normal in size and appearance (5 x 3 mm in size)  soft, tan, flattened, ovoid and smoothly encapsulated. We took care not to disrupt the parathyroid gland during dissection, mobilization, and complete evaluation of the right superior parathyroid gland. The normal appearing right superior parathyroid gland was not biopsied. ---   We began the search for the right inferior parathyroid gland by dissecting gently low into the thyro-thymic ligament and thymus and then proceeding cephalad with the dissection. Dissection was performed using a combination of gentle blunt Kitner dissection as well as the tip of a right-angle clamp to identify and expose a clinically normal appearing right inferior parathyroid gland (5 x 2 mm in size)  soft, tan, flattened, ovoid, and smoothly encapsulated. We took care not to disrupt the right inferior parathyroid gland during dissection, mobilization, and complete evaluation of this parathyroid gland. The normal appearing right inferior parathyroid gland was not biopsied. **   Attention was then re-directed to the left neck. The left thyroid lobe was mobilized anteromedially and during mobilization of the left thyroid lobe we identified what appeared to be an abnormal, tan-yellow appearing parathyroid gland deep to the left thyroid capsular. We carefully incised the left thyroid capsule overlying the abnormal appearing left inferior parathyroid gland. Dissection was performed using the tip of a right-angle clamp to identify and fully expose the abnormal appearing left inferior parathyroid gland (8 x 5 mm in size) situated within the substance of the left thyroid gland   grossly consistent with left inferior parathyroid gland adenoma. The left inferior parathyroid gland was situated just anterior and medial to the encountered left recurrent laryngeal nerve.   We took care not to disrupt the parathyroid gland during dissection, mobilization, and complete evaluation of the left inferior parathyroid gland. An adjunctive tool, parathyroid detection system, was utilized to confirm the surgeon-visualized parathyroid glands throughout the operation. The PTeye parathyroid detection system uses laser probe to evoke a fluorescent response from and confirm the parathyroid tissue remaining in situ. Baseline PTeye was 63, with confirmatory detection ratio of   1.7 for the right superior parathyroid gland,  4.7 for the right inferior parathyroid gland,  2.7 for the left inferior parathyroid gland. The abnormal appearing left inferior intrathyroidal parathyroid gland was excised (8 x 5 mm in size and 0.07 grams in weight with ex vivo counts of 132 pg/mg/min and background count of 38 pg/mg/min; i.e. hyperactive parathyroid), and submitted to frozen section, which was confirmatory  cellular parathyroid tissue. Blood was drawn after the excision of the left inferior intrathyroidal parathyroid adenoma. The result of serum iPTH 10 minutes following resection of the left superior parathyroid adenoma returned as 39.4 pg/ml  ---  Throughout our dissection on both sides of the neck we oriented on the recurrent laryngeal nerves and kept the dissection on the capsule of all four parathyroid glands, taking great care not to rupture the parathyroid gland capsule during complete assessment of each parathyroid gland. We made certain to expose the entire parathyroid gland for each one identified to assess each gland fully. As we did on the left, during dissection on the right we took care to maintain the encountered recurrent laryngeal nerve out of harms way.    With two normal appearing parathyroid glands identified and confirmed with PTeye (right superior, and right inferior parathyroid glands), and two abnormal parathyroid glands excised (left superior and left inferior parathyroid adenomas), and with normalization of serum iPTH after removal of the left superior parathyroid adenoma, we elected to conclude the operation. To summarize: 10 minutes post-resection of the left superior parathyroid adenoma, serum iPTH level dropped from 144.3 pg/ml to 39.4 pg/ml, biochemically consistent with curative resection according to our pre-specified criteria:   1. > 50% drop in serum iPTH from pre-operative baseline, and   2. drop of serum iPTH into the normal range. The structural and functional integrity of the left and right recurrent laryngeal nerves was confirmed with the nerve stimulator ([employer]), as a loud audible signal was attained when both branches of the left and right recurrent laryngeal nerves were stimulated. A diligent search of the central and both lateral areas of the neck was unrevealing, specifically there was no palpable adenopathy in either the right or left lateral (Level II, III, IV) neck, or the central compartment (Level VI and VII). Excellent hemostasis was confirmed. The operative site was irrigated with normal saline and excellent hemostasis confirmed. Excellent hemostasis in the operative field was re-confirmed under Valsalva maneuver. The operative field was irrigated with saline and hemostasis once again confirmed. Surgicel was placed in the operative field as a hemostatic adjunct. The strap musculature (sternothyroid and sternohyoid muscles) was re-approximated in the midline with running 3-0 Vicryl suture. The platysma muscle was reconstituted with running absorbable (3-0 Vicryl) suture. The wound was irrigated with saline. The skin closure was completed with running subcuticular 5-0 monocryl suture and transversely oriented Steri-strips applied to the closed surgical incision. This was an uncomplicated operation with minimal blood loss. The patient was extubated without incident and escorted to the PACU in stable condition with aspiration precautions in effect. Complications: None   Implants: None   Disposition:    To PACU extubated and in stable condition with aspiration precautions in effect.    Edenilson Frazier MD JOSE FACS  Suraj Alegre MD

## 2021-12-10 NOTE — BRIEF OP NOTE
Brief Postoperative Note    Patient: Michael Meade  YOB: 1955  MRN: [id number]  Date of Procedure: 10 December 2021  Pre-operative Diagnosis: Symptomatic Primary Hyperparathyroidism  Post-operative Diagnosis: Symptomatic Primary Hyperparathyroidism  Procedure(s):   Neck exploration   Radioguided parathyroid surgery  Parathyroidectomy  resection of LEFT SUPERIOR parathyroid adenoma  Parathyroidectomy  resection of LEFT INFERIOR parathyroid adenoma  Laryngeal nerve monitoring  Surgeon(s) and Role:   Panel 1:   * Angelica Reed MD  Co-surgeon   * Dimas Rico MD - Co-surgeon   Anesthesia: General + 5 ml of Local (50:50 mix of 1% LIDO + EPI)   Urine output: Not documented   Estimated Blood Loss: 2 ml    IVF: 600 ml   Drains: None   Pre-operative iPTH = 144.3 pg/ml   Patient in room at 1041 hours   Antibiotic prophylaxis Ancef 1.5 gm given at 1055 hours   Beta blocker not indicated   Pre-prep time out: 1053 hours   Prayer at 1059 hours   Time out for Surgery at 1059 hours   (Correct patient, operative site and procedure confirmed, along with having the necessary equipment on hand to perform the operation safely)   Start of surgery at 1100 hours   End of surgery at 1135 hours   VTE prophylaxis with bilateral bilateral lower extremity compression devices. Pressure points padded. Sponge, sharp and instrument count: Correct       History:  68-year-old female with symptomatic primary hyperparathyroidism  fatigue, tired and achy.      She has biochemically confirmed primary hyperparathyroidism (hypercalcemia with non-suppressed serum PTH):     Serum Calcium: 10.6 mg/dl  Serum iPTH: 80.0 pg/ml    No family history of endocrine neoplasm. 11/22/2021  EXAM:  CT NECK SOFT TISSUE W CONT  INDICATION:  Concern for parathyroid adenoma. COMPARISON: Ultrasound neck 11/22/2021. CONTRAST: 100 mL of Isovue-300.   TECHNIQUE: Multislice helical CT was performed of the neck prior to and following uneventful rapid bolus intravenous contrast administration. Contiguous axial images were reconstructed, and lung and soft tissue windows were generated. Coronal and sagittal reformations were generated. CT dose reduction was achieved through use of a standardized protocol tailored for this examination and automatic exposure control for dose modulation. FINDINGS: Multinodular thyroid gland, with multiple small exophytic nodules extending posteriorly from the left thyroid gland. There is no discrete hyperenhancing lesion in the tracheoesophageal grooves in the region of the thyroid gland to suggest parathyroid adenoma. The aerodigestive tract is unremarkable. The parotid and submandibular glands are unremarkable. No evidence of pathologically enlarged cervical lymph nodes. Visualized brain parenchyma is normal in appearance. Visualized paranasal sinuses and mastoid air cells are clear. Visualized intraorbital contents are unremarkable. The cervical vasculature is patent. No acute fracture or aggressive osseous lesion. 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 Multilevel degenerative disc disease in the cervical spine, most advanced at C6-C7. Visualized portions of the lung apices are normal. Moderate calcific atherosclerosis of the aortic arch. IMPRESSION: No definite parathyroid adenoma identified. Multinodular thyroid gland, with multiple exophytic nodules extending posteriorly from the left thyroid gland. 11/22/2021  EXAM:  US HEAD NECK SOFT TISSUE   INDICATION: Concern for parathyroid adenoma. COMPARISON: None. TECHNIQUE: Real-time sonography of the neck was performed with a high frequency  linear transducer. Multiple static images were obtained. FINDINGS: At the posterior aspect of the left inferior thyroid gland, there is a hypoechoic lesion measuring 1.1 x 0.8 x 1.2 cm, which demonstrates internal Doppler flow. Multiple additional small thyroid nodules are noted throughout the thyroid gland.   IMPRESSION: A 1.1 x 0.8 x 1.2 cm hypoechoic lesion at the posterior aspect of the left inferior thyroid gland, which appears to represent an exophytic thyroid nodule over a parathyroid adenoma. Please see dedicated CT neck performed subsequently for further evaluation       Specimens:   ID Type Source Tests Collected by Time Destination   1 : LEFT SUPERIOR PARATHYROID ADENOMA Frozen Section Parathyroid  Arlette Barthel, MD 12/10/2021 1109 Pathology   2 : LEFT INFERIOR INTRATHYROIDAL PARATHYROID ADENOMA Frozen Section Parathyroid  Arlette Barthel, MD 12/10/2021 1127 Pathology      Pre-op holding area: The patient was seen in the pre-operative holding area. Patient was informed of the indications, nature, risks, benefits, alternatives and expected outcomes of the proposed operation:   Neck exploration   Radioguided parathyroid surgery / parathyroidectomy   All other indicated procedures   The patient previously voiced understanding of the aforesaid and provided informed consent for the planned operation. The patient asked pertinent questions, all of which were answered to her apparent satisfaction. The informed consent was reviewed in the holding area and the patient voiced understanding and agreement. The patient agreed to proceed with the recommended operative procedure (Neck exploration; radioguided parathyroid surgery / parathyroidectomy; all other indicated procedures). The patient was examined, and the operative site was marked. Pre-operative imaging is localizing for left inferior parathyroid adenoma.    Procedure (s) performed:   Neck exploration   Radioguided parathyroid surgery  Parathyroidectomy  resection of LEFT SUPERIOR parathyroid adenoma  Parathyroidectomy  resection of LEFT INFERIOR parathyroid adenoma  Laryngeal nerve monitoring   Findings:   Abnormal parathyroid gland identified  grossly consistent with adenoma in the LEFT SUPERIOR anatomical location   Left superior parathyroid adenoma (17 x 12 mm; 0.65 grams); completely excised  intact and submitted to pathology. Frozen section: confirmatory  hypercellular parathyroid tissue   Abnormal LEFT INFERIOR parathyroid gland identified beneath the left thyroid capsule within the left thyroid lobe  grossly consistent with adenoma   Left inferior parathyroid, intrathyroidal adenoma (8 x 5 mm; 0.07 grams); completely excised  intact and submitted to pathology. Frozen section: confirmatory  hypercellular parathyroid tissue     Right superior parathyroid gland, clinically normal (5 x 3 mm); No biopsy obtained. Right inferior parathyroid gland, clinically normal (5 x 2 mm); No biopsy obtained. The right thyroid lobe measures 4 x 3 cm  The left thyroid lobe measures 3.5 x 2 cm  The isthmus measures 3 mm. Multiple, small, sub-centimeter, and encapsulated thyroid nodules throughout the thyroid gland; no findings suspicious for malignancy. No palpable central or lateral neck adenopathy. A diligent search of the central and both lateral areas of the neck was unrevealing, specifically there was no palpable adenopathy in either the right or left lateral (Level II, III, IV) neck, or the central compartment (Level VI and VII). Both right and left recurrent laryngeal nerves were identified and carefully preserved throughout the entire course of the operation, and both recurrent laryngeal nerves were confirmed to be structurally and functionally intact. The structural and functional integrity of the left and right recurrent laryngeal nerves was confirmed with the nerve stimulator ([employer] System), as prominent audible signal was attained when both branches of the left and right recurrent laryngeal nerves were stimulated. Excellent hemostasis confirmed at end of operation.        Specimens:  Para-   thyroid  Right   Left      Type  Score  Type  Score    Upper    Upper      Parathyroid Clinically normal   (5 x 3 mm in size)  Biopsy not obtained  Frozen section: None   Parathyroid Adenoma   (17 x 12 mm in size;   weight 0.06 grams)   Completely excised   Frozen: Hypercellular parathyroid Counts = 989  Background: 38     Lower         Parathyroid Clinically normal   (5 x 2 mm in size)  Biopsy not obtained  Frozen section: None  Parathyroid Adenoma   (8 x 5 mm in size;   weight 0.07 grams)   Completely excised   Frozen: Hypercellular parathyroid Counts = 132  Background: 38       Operation:   The patient was escorted to the operating room. Upon arrival to the operative room, the patient, procedure, and operative site were confirmed via a pre-operative TIME OUT; all staff in attendance agreed. The patient was placed in the supine position and general anesthesia induced without incident, using a NIM endotracheal tube for laryngeal nerve monitoring. Prophylactic antibiotic (Ancef 1.5 gm IVPB) was administered prior to the procedure. Beta blocker not indicated. With the patient in the supine position the arms were tucked, a pillow was placed behind the knees and the heels padded; the neck was slightly extended, and head of the operating table elevated to 30 degrees. Roll was placed behind the scapulae to create mild degree of neck extension. Pressure ulcer prophylaxis was in effect with pressure point padding. VTE prophylaxis was also in effect with lower extremity mechanical compression devices. Sterile anterior neck prep (Chlorhexidine - alcohol) and drape. We prayed for our patient, and we repeated the TIME OUT prior to commencing the operation to confirm the    correct patient,    correct operative site,    correct operative procedure, and    necessary equipment on hand to conduct the operation safely. Local anesthesia (5 ml of 50:50 mix of 1% LIDO + EPI) infiltrated subcutaneously at site of planned anterior cervical skin incision. Pre-operative serum iPTH level was 144.3 pg/ml. A 4 cm, transverse anterior cervical incision was made.    Limited sub-platysmal flaps were created. [employer] Navigator GPS radio-immuno-guided surgery device was set on Tc99m and 100X. Strap musculature was  in the midline. A dissection plane was developed posterior to the left strap musculature using cautery and facilitated by atraumatic [facility] retractors. Gerardo Hakim was used to displace the left thyroid lobe anteriorly and medially as the left lateral thyroid recess was developed using electrocautery, and then gentle blunt dissection using the Kitner dissector. The fibroareolar floor in the left posterior neck was opened with electrocautery that provided access to the left retroesophageal space. We sought to identify the key structures for exposure of the left superior parathyroid gland the arteriovenous band situated immediately cephalad to where the left middle thyroid vein is situated, and posterior and lateral to the recurrent laryngeal nerve. The superior parathyroid gland is usually encountered at the Ligament of [place] dorsolateral to the recurrent laryngeal nerve. The superior parathyroid gland originates from the 4th pharyngeal pouch. The superior parathyroid gland is more posterior in position relative to the more anteriorly situated inferior parathyroid gland. The inferior parathyroid gland is usually encountered anterior and medial to the recurrent laryngeal nerve, inferior to where the recurrent laryngeal nerve crosses the inferior thyroid artery. The inferior parathyroid gland originates from the 3rd pharyngeal pouch. Typically (~75% of cases), the superior parathyroid gland is situated near the posterior aspect of the thyroid gland, at the level of the cricothyroid junction, in proximity to where the recurrent laryngeal nerve enters the larynx. The superior parathyroid gland is consistently situated dorsal/posterior and lateral to the recurrent laryngeal nerve.   In a smaller percentage of patients (~24% of cases) the superior parathyroid gland is adjacent to the superior pole of the thyroid gland. In a small percentage of patients (~1%), the superior parathyroid gland is in a retro-pharyngeal, para-esophageal, retro-esophageal, or posterior-superior mediastinal location. Rarely is the superior parathyroid gland situated within the substance of the superior or lateral pole of the thyroid gland. Dissection was performed using a combination of gentle blunt Kitner dissection as well as the tip of a right-angle clamp to identify and expose an abnormal appearing left superior parathyroid gland (17 x 12 mm in size)   grossly consistent with left inferior parathyroid gland adenoma. We took care not to disrupt the left superior parathyroid gland during dissection, mobilization, and complete evaluation of this parathyroid gland. The left superior parathyroid gland adenoma was excised (17 x 12 mm in size and 0.65 grams in weight with ex vivo counts of 989 pg/mg/min and background count of 38 pg/mg/min; i.e. hyperactive parathyroid), and submitted to frozen section, which was confirmatory  hypercellular parathyroid tissue. Blood was drawn after the excision of the left superior parathyroid adenoma. ---   We began the search for the left inferior parathyroid gland by dissecting gently low into the thyro-thymic ligament and apical thymus, and then proceeding cephalad with the dissection. The anatomic location of the inferior parathyroid gland is much more variable than that of the superior parathyroid gland, given the longer and more variable embryological migratory path of the 3rd pharyngeal pouch with the thymus. The inferior parathyroid gland may be embedded within the thymus, within the thyroid gland itself, or found within the anterior or posterior mediastinum.   The most common location we find the inferior parathyroid gland (~40% of the time) is in proximity to the posterior thyroid capsule at the level of the 1st tracheal ring, in proximity to the inferior thyroid artery - anterior and medial to the recurrent laryngeal nerve, inferior to where the nerve crosses the inferior thyroid artery. When we dont find the inferior parathyroid gland in this location we search for other possible anatomic locations  thyro-thymic ligament, within the substance of the thymus near the level of the thoracic inlet (in ~40% of cases the inferior parathyroid gland is found in this location). Other potential anatomic locations for the inferior parathyroid gland include lateral to the thyroid gland (15%), or in atypical or ectopic locations (carotid sheath or mid-thyroid level lateral to the carotid sheath, or deep within the thyroid gland itself 2% of cases). When not identified during initial operation, post-operative imaging is performed in cases of persistent or recurrent hyperparathyroidism to search for the abnormal inferior parathyroid gland, or in rare cases, the supernumerary or 5th parathyroid gland. Anatomic areas assessed during post-operative imaging in patients with persistent or recurrent hyperparathyroidism include the anterior or posterior mediastinum, posterior to the trachea and anterior to the esophagus, or posterior to the esophagus, and at or above the angle of the mandible (to assess for an undescended parathyroid gland). We were unable to locate the left inferior parathyroid gland. ---   Attention was then directed to the right neck. A dissection plane was developed posterior to the right neck strap musculature using cautery and facilitated by atraumatic [employer] retractors   A Brendia Right was used to displace the right thyroid lobe anteromedially as the right lateral thyroid recess was developed using electrocautery, and then gentle blunt dissection using a Kitner dissector. The fibroareolar floor was opened with electrocautery that provided access to the right posterior neck  the para-esophageal space.    We sought to identify the key structures for exposure of the right superior parathyroid gland the arteriovenous band situated immediately cephalad to where the right middle thyroid vein is situated. The right superior parathyroid gland was situated just posterior and lateral to the encountered right recurrent laryngeal nerve. The right superior parathyroid gland was identified and found to be clinically normal in size and appearance (5 x 3 mm in size)  soft, tan, flattened, ovoid and smoothly encapsulated. We took care not to disrupt the parathyroid gland during dissection, mobilization, and complete evaluation of the right superior parathyroid gland. The normal appearing right superior parathyroid gland was not biopsied. ---   We began the search for the right inferior parathyroid gland by dissecting gently low into the thyro-thymic ligament and thymus and then proceeding cephalad with the dissection. Dissection was performed using a combination of gentle blunt Kitner dissection as well as the tip of a right-angle clamp to identify and expose a clinically normal appearing right inferior parathyroid gland (5 x 2 mm in size)  soft, tan, flattened, ovoid, and smoothly encapsulated. We took care not to disrupt the right inferior parathyroid gland during dissection, mobilization, and complete evaluation of this parathyroid gland. The normal appearing right inferior parathyroid gland was not biopsied. **   Attention was then re-directed to the left neck. The left thyroid lobe was mobilized anteromedially and during mobilization of the left thyroid lobe we identified what appeared to be an abnormal, tan-yellow appearing parathyroid gland deep to the left thyroid capsular. We carefully incised the left thyroid capsule overlying the abnormal appearing left inferior parathyroid gland.    Dissection was performed using the tip of a right-angle clamp to identify and fully expose the abnormal appearing left inferior parathyroid gland (8 x 5 mm in size) situated within the substance of the left thyroid gland   grossly consistent with left inferior parathyroid gland adenoma. The left inferior parathyroid gland was situated just anterior and medial to the encountered left recurrent laryngeal nerve. We took care not to disrupt the parathyroid gland during dissection, mobilization, and complete evaluation of the left inferior parathyroid gland. An adjunctive tool, parathyroid detection system, was utilized to confirm the surgeon-visualized parathyroid glands throughout the operation. The PTeye parathyroid detection system uses laser probe to evoke a fluorescent response from and confirm the parathyroid tissue remaining in situ. Baseline PTeye was 63, with confirmatory detection ratio of   1.7 for the right superior parathyroid gland,  4.7 for the right inferior parathyroid gland,  2.7 for the left inferior parathyroid gland. The abnormal appearing left inferior intrathyroidal parathyroid gland was excised (8 x 5 mm in size and 0.07 grams in weight with ex vivo counts of 132 pg/mg/min and background count of 38 pg/mg/min; i.e. hyperactive parathyroid), and submitted to frozen section, which was confirmatory  cellular parathyroid tissue. Blood was drawn after the excision of the left inferior intrathyroidal parathyroid adenoma. The result of serum iPTH 10 minutes following resection of the left superior parathyroid adenoma returned as 39.4 pg/ml  ---  Throughout our dissection on both sides of the neck we oriented on the recurrent laryngeal nerves and kept the dissection on the capsule of all four parathyroid glands, taking great care not to rupture the parathyroid gland capsule during complete assessment of each parathyroid gland. We made certain to expose the entire parathyroid gland for each one identified to assess each gland fully.    As we did on the left, during dissection on the right we took care to maintain the encountered recurrent laryngeal nerve out of harms way. With two normal appearing parathyroid glands identified and confirmed with PTeye (right superior, and right inferior parathyroid glands), and two abnormal parathyroid glands excised (left superior and left inferior parathyroid adenomas), and with normalization of serum iPTH after removal of the left superior parathyroid adenoma, we elected to conclude the operation. To summarize: 10 minutes post-resection of the left superior parathyroid adenoma, serum iPTH level dropped from 144.3 pg/ml to 39.4 pg/ml, biochemically consistent with curative resection according to our pre-specified criteria:   1. > 50% drop in serum iPTH from pre-operative baseline, and   2. drop of serum iPTH into the normal range. The structural and functional integrity of the left and right recurrent laryngeal nerves was confirmed with the nerve stimulator ([employer] System), as a loud audible signal was attained when both branches of the left and right recurrent laryngeal nerves were stimulated. A diligent search of the central and both lateral areas of the neck was unrevealing, specifically there was no palpable adenopathy in either the right or left lateral (Level II, III, IV) neck, or the central compartment (Level VI and VII). Excellent hemostasis was confirmed. The operative site was irrigated with normal saline and excellent hemostasis confirmed. Excellent hemostasis in the operative field was re-confirmed under Valsalva maneuver. The operative field was irrigated with saline and hemostasis once again confirmed. Surgicel was placed in the operative field as a hemostatic adjunct. The strap musculature (sternothyroid and sternohyoid muscles) was re-approximated in the midline with running 3-0 Vicryl suture. The platysma muscle was reconstituted with running absorbable (3-0 Vicryl) suture. The wound was irrigated with saline.   The skin closure was completed with running subcuticular 5-0 monocryl suture and transversely oriented Steri-strips applied to the closed surgical incision. This was an uncomplicated operation with minimal blood loss. The patient was extubated without incident and escorted to the PACU in stable condition with aspiration precautions in effect. Complications: None   Implants: None   Disposition: To PACU extubated and in stable condition with aspiration precautions in effect.    Cliff Santos MD  Electronically Signed by Cliff Escobar MD on 12/10/2021 at 12:48 PM

## 2021-12-10 NOTE — ANESTHESIA POSTPROCEDURE EVALUATION
Procedure(s): MINIMALLY INVASIVE RADIOGUIDED PARATHYROID EXPLORATION WITH IOPTH AND PTEYE.    general    Anesthesia Post Evaluation      Multimodal analgesia: multimodal analgesia used between 6 hours prior to anesthesia start to PACU discharge  Patient location during evaluation: PACU  Patient participation: complete - patient participated  Level of consciousness: awake  Pain score: 2  Pain management: adequate  Airway patency: patent  Anesthetic complications: no  Cardiovascular status: acceptable  Respiratory status: acceptable  Hydration status: acceptable  Comments: I have evaluated the patient and meets criteria for discharge from PACU. Caitlin Garrett MD  Post anesthesia nausea and vomiting:  controlled  Final Post Anesthesia Temperature Assessment:  Normothermia (36.0-37.5 degrees C)      INITIAL Post-op Vital signs:   Vitals Value Taken Time   /72 12/10/21 1245   Temp 36.6 °C (97.8 °F) 12/10/21 1156   Pulse 75 12/10/21 1254   Resp 17 12/10/21 1254   SpO2 100 % 12/10/21 1254   Vitals shown include unvalidated device data.

## 2021-12-10 NOTE — PROGRESS NOTES
Patient interviewed and examined  Resting comfortably seated upright in bed  Post-operative systolic hypertension with /80 mmHg responded well to 10 mg dose of hydralazine    Tolerating ice chips and sips of water by mouth  No dysarthria, dysphonia or hoarseness    Alert and fully oriented x 4/4  Appears comfortable    T 97.8, HR 73, RR 17, /68, SpO2 97% RA    Anterior cervical incision is clean, dry and intact with steri strips securely in place  No signs or symptoms of operative site hematoma    Doing well on DOS s/p neck exploration, minimally invasive parathyorid surgery and resection of left superior parathyroid adenoma and resection on left inferior intrathyroidal parathyroid adenoma with biochemical confirmation of curative resection with decrease serum iPTH from 144.3 pg/ml (pre-op) to 28.1 pg/ml (post-op)    Details of surgery and operative findings explained to the patient in detail    All of her questions were answered to her stated satisfaction    Continue overnight monitoring  Aspiration precautions  Monitor serum Ca/Mg/Phos  Oral calcium and vitamin D replacement    Korey Ochoa MD [facility]

## 2021-12-11 VITALS
DIASTOLIC BLOOD PRESSURE: 60 MMHG | OXYGEN SATURATION: 96 % | BODY MASS INDEX: 17.75 KG/M2 | WEIGHT: 94 LBS | SYSTOLIC BLOOD PRESSURE: 133 MMHG | HEIGHT: 61 IN | RESPIRATION RATE: 17 BRPM | TEMPERATURE: 98.6 F | HEART RATE: 70 BPM

## 2021-12-11 LAB
ANION GAP SERPL CALC-SCNC: 7 MMOL/L (ref 5–15)
BASOPHILS # BLD: 0 K/UL (ref 0–0.1)
BASOPHILS NFR BLD: 0 % (ref 0–1)
BUN SERPL-MCNC: 17 MG/DL (ref 6–20)
BUN/CREAT SERPL: 17 (ref 12–20)
CALCIUM SERPL-MCNC: 11.3 MG/DL (ref 8.5–10.1)
CHLORIDE SERPL-SCNC: 102 MMOL/L (ref 97–108)
CO2 SERPL-SCNC: 26 MMOL/L (ref 21–32)
CREAT SERPL-MCNC: 1 MG/DL (ref 0.55–1.02)
DIFFERENTIAL METHOD BLD: ABNORMAL
EOSINOPHIL # BLD: 0 K/UL (ref 0–0.4)
EOSINOPHIL NFR BLD: 0 % (ref 0–7)
ERYTHROCYTE [DISTWIDTH] IN BLOOD BY AUTOMATED COUNT: 15.3 % (ref 11.5–14.5)
GLUCOSE SERPL-MCNC: 125 MG/DL (ref 65–100)
HCT VFR BLD AUTO: 40.3 % (ref 35–47)
HGB BLD-MCNC: 13.3 G/DL (ref 11.5–16)
IMM GRANULOCYTES # BLD AUTO: 0.1 K/UL (ref 0–0.04)
IMM GRANULOCYTES NFR BLD AUTO: 1 % (ref 0–0.5)
LYMPHOCYTES # BLD: 3.2 K/UL (ref 0.8–3.5)
LYMPHOCYTES NFR BLD: 15 % (ref 12–49)
MCH RBC QN AUTO: 29.9 PG (ref 26–34)
MCHC RBC AUTO-ENTMCNC: 33 G/DL (ref 30–36.5)
MCV RBC AUTO: 90.6 FL (ref 80–99)
MONOCYTES # BLD: 1.7 K/UL (ref 0–1)
MONOCYTES NFR BLD: 8 % (ref 5–13)
NEUTS SEG # BLD: 16.1 K/UL (ref 1.8–8)
NEUTS SEG NFR BLD: 76 % (ref 32–75)
NRBC # BLD: 0 K/UL (ref 0–0.01)
NRBC BLD-RTO: 0 PER 100 WBC
PLATELET # BLD AUTO: 270 K/UL (ref 150–400)
PMV BLD AUTO: 9.6 FL (ref 8.9–12.9)
POTASSIUM SERPL-SCNC: 4 MMOL/L (ref 3.5–5.1)
RBC # BLD AUTO: 4.45 M/UL (ref 3.8–5.2)
SODIUM SERPL-SCNC: 135 MMOL/L (ref 136–145)
WBC # BLD AUTO: 21.1 K/UL (ref 3.6–11)

## 2021-12-11 PROCEDURE — 80048 BASIC METABOLIC PNL TOTAL CA: CPT

## 2021-12-11 PROCEDURE — 36415 COLL VENOUS BLD VENIPUNCTURE: CPT

## 2021-12-11 PROCEDURE — 85025 COMPLETE CBC W/AUTO DIFF WBC: CPT

## 2021-12-11 PROCEDURE — 74011250637 HC RX REV CODE- 250/637: Performed by: OTOLARYNGOLOGY

## 2021-12-11 PROCEDURE — 74011250636 HC RX REV CODE- 250/636: Performed by: NURSE PRACTITIONER

## 2021-12-11 PROCEDURE — G0378 HOSPITAL OBSERVATION PER HR: HCPCS

## 2021-12-11 RX ORDER — CALCITRIOL 0.25 UG/1
0.25 CAPSULE ORAL DAILY
Qty: 30 CAPSULE | Refills: 2 | Status: SHIPPED | OUTPATIENT
Start: 2021-12-11

## 2021-12-11 RX ORDER — OXYCODONE AND ACETAMINOPHEN 5; 325 MG/1; MG/1
1 TABLET ORAL
Qty: 12 TABLET | Refills: 0 | Status: SHIPPED | OUTPATIENT
Start: 2021-12-11 | End: 2021-12-14

## 2021-12-11 RX ORDER — FERROUS SULFATE, DRIED 160(50) MG
1 TABLET, EXTENDED RELEASE ORAL EVERY 6 HOURS
Qty: 180 TABLET | Refills: 3 | Status: SHIPPED | OUTPATIENT
Start: 2021-12-11

## 2021-12-11 RX ADMIN — CALCIUM CARBONATE-VITAMIN D TAB 500 MG-200 UNIT 1 TABLET: 500-200 TAB at 06:44

## 2021-12-11 RX ADMIN — HYDRALAZINE HYDROCHLORIDE 10 MG: 20 INJECTION, SOLUTION INTRAMUSCULAR; INTRAVENOUS at 00:24

## 2021-12-11 RX ADMIN — OXYCODONE AND ACETAMINOPHEN 1 TABLET: 5; 325 TABLET ORAL at 00:22

## 2021-12-11 RX ADMIN — CALCIUM CARBONATE-VITAMIN D TAB 500 MG-200 UNIT 1 TABLET: 500-200 TAB at 00:14

## 2021-12-11 RX ADMIN — OXYCODONE AND ACETAMINOPHEN 1 TABLET: 10; 325 TABLET ORAL at 04:11

## 2021-12-11 NOTE — PROGRESS NOTES
Patient interviewed and examined  Uneventful night  Responded well to PRN Hydralazine for BP control  Sitting upright in bedside chair  Tolerating regular diet  Ambulatory  Voiding without difficulty     No dysarthria, dysphonia or hoarseness     Alert and fully oriented x 4/4  Appears comfortable     T 98.9, HR 87, RR 16, -152 / DBP 61 mmHg, SpO2 95% RA     Anterior cervical incision is clean, dry and intact with steri strips securely in place  No signs or symptoms of operative site hematoma     Doing well POD1 s/p neck exploration, minimally invasive parathyorid surgery and resection of left superior parathyroid adenoma and resection of left inferior intrathyroidal parathyroid adenoma with biochemical confirmation of curative resection with decrease serum iPTH from 144.3 pg/ml (pre-op) to 28.1 pg/ml (post-op)     Details of surgery and operative findings explained once again to the patient in detail  All of her questions were answered to her stated satisfaction     Discharge instructions provided  Plan to discharge home today  Continue oral calcium and vitamin D replacement     Dat Randall MD [facility] FACS

## 2021-12-11 NOTE — PROGRESS NOTES
0730: Bedside shift change report given to McClureban Christy RN (oncoming nurse) by Lauren Garces RN (offgoing nurse). Report included the following information SBAR, Kardex, OR Summary, Procedure Summary, Intake/Output, MAR and Recent Results. 1015: I have reviewed discharge instructions with the patient. The patient verbalized understanding. Discharge medications reviewed with patient and appropriate educational materials and side effects teaching were provided.     1135: Patient discharged via wheelchair with RN

## 2021-12-11 NOTE — DISCHARGE INSTRUCTIONS
Post  Parathyroidectomy Instructions    Follow up: with Dr. Aditya Boyd month after surgery . Shortly after surgery call [phone] to schedule this appointment. You may remove your steristrips after 2 weeks  If you have no tingling in your hands or around your mouth (sign of low calcium) in 2 weeks, you can decrease your calcium to 1 tablet every 8 hours.  Eat regular foods.  You may shower in 24 hours. Do not allow direct water pressure on your wound. If water trickles down while washing your hair, allow the wound to dry on its own.  Do not scrub or soak your wound for 2 weeks or 14 days.  No strenuous activity: for 14 days.  No moving more than 15 pounds: for 14 days. Then includes pulling, pushing, tugging, throwing.  There is generally not a lot of pain: with this surgery. Take your pain medication as needed. Most patients, if they do have pain, will have neck stiffness/discomfort. You may have numbness or tingling surrounding the area of your wound. Narcotics can cause constipation; use your Colace if this is the case.  Nausea and vomiting: from lingering effects of general anesthesia usually resolves by the following day. The narcotic pain medication can cause nausea and vomiting. They should be taken with food or fluids to minimize this. Medications that reduce nausea and vomiting can be prescribed by your physician.  Fever above 100.4, redness around wound, pus drainage from wound: call your doctor.  Bleeding: is uncommon (less than 1%). If it does occur your neck will develop a fullness. It is good to take a look at your neck shortly after surgery to see what a baseline appearance is. If there are changes to this, then call your provider. \    [facility]  for concerns    Special Instructions    o You may be on Calcium (Oscal) - it is very important that you take this.   Dr. Magui Coto will start a taper at your follow up visit  o If you experience tingling in the hands or around your mouth, your calcium may be dropping, go to the emergency room immediately and tell them you had your parathyroid removed.

## 2021-12-12 LAB
ATRIAL RATE: 91 BPM
CALCULATED P AXIS, ECG09: 29 DEGREES
CALCULATED R AXIS, ECG10: 73 DEGREES
CALCULATED T AXIS, ECG11: 41 DEGREES
DIAGNOSIS, 93000: NORMAL
P-R INTERVAL, ECG05: 114 MS
Q-T INTERVAL, ECG07: 366 MS
QRS DURATION, ECG06: 82 MS
QTC CALCULATION (BEZET), ECG08: 450 MS
VENTRICULAR RATE, ECG03: 91 BPM

## 2022-03-19 PROBLEM — E21.3 HYPERPARATHYROIDISM (HCC): Status: ACTIVE | Noted: 2021-12-10

## 2023-05-15 RX ORDER — CALCITRIOL 0.25 UG/1
0.25 CAPSULE, LIQUID FILLED ORAL DAILY
COMMUNITY
Start: 2021-12-11

## 2023-05-15 RX ORDER — B-COMPLEX WITH VITAMIN C
1 TABLET ORAL EVERY 6 HOURS
COMMUNITY
Start: 2021-12-11

## (undated) DEVICE — SUTURE VCRL SZ 3-0 L27IN ABSRB UD L26MM SH 1/2 CIR J416H

## (undated) DEVICE — HOOK RETRCT L5MM E SHRP SELF RET SYS LONE STAR

## (undated) DEVICE — SUTURE MCRYL SZ 4-0 L27IN ABSRB UD L19MM PS-2 1/2 CIR PRIM Y426H

## (undated) DEVICE — MASTISOL ADHESIVE LIQ 2/3ML

## (undated) DEVICE — SMOKE EVACUATION PENCIL: Brand: VALLEYLAB

## (undated) DEVICE — TOWEL SURG W17XL27IN STD BLU COT NONFENESTRATED PREWASHED

## (undated) DEVICE — EMG TUBE 8229707 NIM TRIVANTAGE 7.0MM ID: Brand: NIM TRIVANTAGE™

## (undated) DEVICE — SOLUTION IRRIG 1000ML 0.9% SOD CHL USP POUR PLAS BTL

## (undated) DEVICE — PROBE PTEYE-1 FIBER OPTIC: Brand: PTEYE

## (undated) DEVICE — SPONGE GZ W4XL4IN COT RADPQ HIGHLY ABSRB

## (undated) DEVICE — SYR 10ML LUER LOK 1/5ML GRAD --

## (undated) DEVICE — MAGNETIC INSTR DRAPE 20X16: Brand: MEDLINE INDUSTRIES, INC.

## (undated) DEVICE — STRIP,CLOSURE,WOUND,MEDI-STRIP,1/2X4: Brand: MEDLINE

## (undated) DEVICE — THYROID - SMH AMB: Brand: MEDLINE INDUSTRIES, INC.

## (undated) DEVICE — INTENDED FOR TISSUE SEPARATION, AND OTHER PROCEDURES THAT REQUIRE A SHARP SURGICAL BLADE TO PUNCTURE OR CUT.: Brand: BARD-PARKER ® CARBON RIB-BACK BLADES

## (undated) DEVICE — SHEAR RMFG HARMONIC FOCUS 9CM -- OEM ITEM L#322125

## (undated) DEVICE — COVER US PRB W12XL244CM FLD IORT STR TIP

## (undated) DEVICE — SUTURE ETHLN SZ 4-0 L18IN NONABSORBABLE BLK L19MM PS-2 3/8 1667H

## (undated) DEVICE — GLOVE,SURG,SENSICARE,ALOE,LF,PF,7: Brand: MEDLINE

## (undated) DEVICE — AGENT HEMSTAT W2XL3IN OXIDIZED REGENERATED CELOS ABSRB

## (undated) DEVICE — PREP SKN CHLRAPRP APL 26ML STR --